# Patient Record
Sex: MALE | Race: OTHER | URBAN - METROPOLITAN AREA
[De-identification: names, ages, dates, MRNs, and addresses within clinical notes are randomized per-mention and may not be internally consistent; named-entity substitution may affect disease eponyms.]

---

## 2024-05-14 VITALS
OXYGEN SATURATION: 93 % | HEART RATE: 109 BPM | RESPIRATION RATE: 26 BRPM | SYSTOLIC BLOOD PRESSURE: 141 MMHG | TEMPERATURE: 98 F | DIASTOLIC BLOOD PRESSURE: 87 MMHG | WEIGHT: 177.91 LBS | HEIGHT: 74 IN

## 2024-05-14 LAB
ANION GAP SERPL CALC-SCNC: 16 MMOL/L — SIGNIFICANT CHANGE UP (ref 5–17)
BASE EXCESS BLDV CALC-SCNC: 1 MMOL/L — SIGNIFICANT CHANGE UP (ref -2–3)
BASOPHILS # BLD AUTO: 0.02 K/UL — SIGNIFICANT CHANGE UP (ref 0–0.2)
BASOPHILS NFR BLD AUTO: 0.3 % — SIGNIFICANT CHANGE UP (ref 0–2)
BUN SERPL-MCNC: 12 MG/DL — SIGNIFICANT CHANGE UP (ref 7–23)
CALCIUM SERPL-MCNC: 9.9 MG/DL — SIGNIFICANT CHANGE UP (ref 8.4–10.5)
CHLORIDE SERPL-SCNC: 101 MMOL/L — SIGNIFICANT CHANGE UP (ref 96–108)
CO2 BLDV-SCNC: 27.3 MMOL/L — HIGH (ref 22–26)
CO2 SERPL-SCNC: 24 MMOL/L — SIGNIFICANT CHANGE UP (ref 22–31)
CREAT SERPL-MCNC: 0.97 MG/DL — SIGNIFICANT CHANGE UP (ref 0.5–1.3)
EGFR: 106 ML/MIN/1.73M2 — SIGNIFICANT CHANGE UP
EOSINOPHIL # BLD AUTO: 0 K/UL — SIGNIFICANT CHANGE UP (ref 0–0.5)
EOSINOPHIL NFR BLD AUTO: 0 % — SIGNIFICANT CHANGE UP (ref 0–6)
GAS PNL BLDV: SIGNIFICANT CHANGE UP
GLUCOSE SERPL-MCNC: 118 MG/DL — HIGH (ref 70–99)
HCO3 BLDV-SCNC: 26 MMOL/L — SIGNIFICANT CHANGE UP (ref 22–29)
HCT VFR BLD CALC: 43.6 % — SIGNIFICANT CHANGE UP (ref 39–50)
HGB BLD-MCNC: 14.6 G/DL — SIGNIFICANT CHANGE UP (ref 13–17)
IMM GRANULOCYTES NFR BLD AUTO: 0.4 % — SIGNIFICANT CHANGE UP (ref 0–0.9)
LYMPHOCYTES # BLD AUTO: 0.59 K/UL — LOW (ref 1–3.3)
LYMPHOCYTES # BLD AUTO: 7.8 % — LOW (ref 13–44)
MCHC RBC-ENTMCNC: 30.7 PG — SIGNIFICANT CHANGE UP (ref 27–34)
MCHC RBC-ENTMCNC: 33.5 GM/DL — SIGNIFICANT CHANGE UP (ref 32–36)
MCV RBC AUTO: 91.8 FL — SIGNIFICANT CHANGE UP (ref 80–100)
MONOCYTES # BLD AUTO: 0.09 K/UL — SIGNIFICANT CHANGE UP (ref 0–0.9)
MONOCYTES NFR BLD AUTO: 1.2 % — LOW (ref 2–14)
NEUTROPHILS # BLD AUTO: 6.81 K/UL — SIGNIFICANT CHANGE UP (ref 1.8–7.4)
NEUTROPHILS NFR BLD AUTO: 90.3 % — HIGH (ref 43–77)
NRBC # BLD: 0 /100 WBCS — SIGNIFICANT CHANGE UP (ref 0–0)
PCO2 BLDV: 42 MMHG — SIGNIFICANT CHANGE UP (ref 42–55)
PH BLDV: 7.4 — SIGNIFICANT CHANGE UP (ref 7.32–7.43)
PLATELET # BLD AUTO: 365 K/UL — SIGNIFICANT CHANGE UP (ref 150–400)
PO2 BLDV: 54 MMHG — HIGH (ref 25–45)
POTASSIUM SERPL-MCNC: 4.1 MMOL/L — SIGNIFICANT CHANGE UP (ref 3.5–5.3)
POTASSIUM SERPL-SCNC: 4.1 MMOL/L — SIGNIFICANT CHANGE UP (ref 3.5–5.3)
RBC # BLD: 4.75 M/UL — SIGNIFICANT CHANGE UP (ref 4.2–5.8)
RBC # FLD: 11.9 % — SIGNIFICANT CHANGE UP (ref 10.3–14.5)
SAO2 % BLDV: 87.2 % — SIGNIFICANT CHANGE UP (ref 67–88)
SODIUM SERPL-SCNC: 141 MMOL/L — SIGNIFICANT CHANGE UP (ref 135–145)
WBC # BLD: 7.54 K/UL — SIGNIFICANT CHANGE UP (ref 3.8–10.5)
WBC # FLD AUTO: 7.54 K/UL — SIGNIFICANT CHANGE UP (ref 3.8–10.5)

## 2024-05-14 PROCEDURE — 71046 X-RAY EXAM CHEST 2 VIEWS: CPT | Mod: 26

## 2024-05-14 PROCEDURE — 99285 EMERGENCY DEPT VISIT HI MDM: CPT

## 2024-05-14 RX ORDER — IPRATROPIUM/ALBUTEROL SULFATE 18-103MCG
3 AEROSOL WITH ADAPTER (GRAM) INHALATION
Refills: 0 | Status: COMPLETED | OUTPATIENT
Start: 2024-05-14 | End: 2024-05-15

## 2024-05-14 RX ORDER — IPRATROPIUM/ALBUTEROL SULFATE 18-103MCG
3 AEROSOL WITH ADAPTER (GRAM) INHALATION
Refills: 0 | Status: COMPLETED | OUTPATIENT
Start: 2024-05-14 | End: 2024-05-14

## 2024-05-14 RX ORDER — MAGNESIUM SULFATE 500 MG/ML
2 VIAL (ML) INJECTION ONCE
Refills: 0 | Status: COMPLETED | OUTPATIENT
Start: 2024-05-14 | End: 2024-05-14

## 2024-05-14 RX ADMIN — Medication 3 MILLILITER(S): at 22:12

## 2024-05-14 RX ADMIN — Medication 3 MILLILITER(S): at 22:06

## 2024-05-14 RX ADMIN — Medication 3 MILLILITER(S): at 22:17

## 2024-05-14 RX ADMIN — Medication 125 MILLIGRAM(S): at 22:14

## 2024-05-14 RX ADMIN — Medication 150 GRAM(S): at 22:20

## 2024-05-14 NOTE — ED ADULT TRIAGE NOTE - CHIEF COMPLAINT QUOTE
"My asthma is acting up. I was just at [facility] and they wanted to admit me but they was taking too long."

## 2024-05-14 NOTE — ED PROVIDER NOTE - OBJECTIVE STATEMENT
34 yr old male, history of asthma (never hospitalized or intubated), presents to the Emergency Department w asthma exacerbation. pt w one week of worsening asthma symptoms. 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 has needed to go to ER ([place]) every few weeks for nebulizer treatments and steroids.   today at [place] got multiple rounds of duonebs, steroids, mag. had minimal improvement and was planned for admission but was told he would not get a bed so he left and came here for management.  previous smoker - quit a few months ago when symptoms worsened. 34 yr old male, history of asthma (never hospitalized or intubated), presents to the Emergency Department w asthma exacerbation. pt w one week of worsening asthma symptoms. 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 has needed to go to ER ([place]) every few weeks for nebulizer treatments and steroids. symptoms this week worse, no relief w home meds. today at [place] got multiple rounds of duonebs, steroids, mag. had minimal improvement and was planned for admission but was told he would not get a bed so he left and came here for management.  previous smoker - quit a few months ago when symptoms worsened.

## 2024-05-14 NOTE — ED PROVIDER NOTE - PHYSICAL EXAMINATION
Constitutional : non-toxic, slightly distressed / anxious. awake, alert, oriented to person, place, time/situation.  Head : head normocephalic, atraumatic  EENMT : eyes clear bilaterally, PERRL, EOMI. airway patent. moist mucous membranes. neck supple. oropharynx noninjected, no tonsillar hypertrophy or exudates. uvula midline w/o edema. tolerating secretions.   Cardiac : Normal rate, regular rhythm. No murmur appreciated, no LE edema.  Resp : +tachypenic. poor air movement. diffuse inspiratory and expiratory wheezing. no rales / rhonchi. no stridor.   Gastro : abdomen soft, nontender, nondistended. no rebound or guarding. no CVAT.  MSK :  range of motion is not limited, no muscle or joint tenderness  Back : No evidence of trauma. No spinal or CVA tenderness.  Vasc : Extremities warm and well perfused. 2+ radial and DP pulses. cap refill <2 seconds  Neuro : Alert and oriented, CNII-XII grossly intact, no motor or sensory deficits.  Skin : Skin normal color for race, warm, dry and intact. No evidence of rash.  Psych : Alert and oriented to person, place, time/situation. normal mood and affect. no apparent risk to self or others.

## 2024-05-14 NOTE — ED ADULT NURSE NOTE - OBJECTIVE STATEMENT
pt c/o asthma exac. was going to be admitted at [facility] but left d/t long wait. received nebs, magnesium, and steroid at [facility] "hours ago." pt states was feeling better at [place] but sx worsened during his commute here. pt is struggling to make sentences w/ increased work of breathing. satting 97% on ra. started on duoneb. on ccm

## 2024-05-14 NOTE — ED PROVIDER NOTE - CLINICAL SUMMARY MEDICAL DECISION MAKING FREE TEXT BOX
history of asthma (never hospitalized or intubated), poorly controlled asthma symptoms x 1 year since starting new job @ [employer]. here w one week worsening symptoms and no relief w home inhalers. went to [place] - planned for admission after duonebs / steroids / meds but wasn't going to get a bed so came for eval.   pt nontoxic appearing but notably tachypenic, tachycardic, sats 93% on room air, lungs w poor air movement. diffuse inspiratory and expiratory wheezing. no rales / rhonchi. no stridor.   asthma exacerbation w poor baseline control x 1 yr.   r/o viral illness, pna.   trial duonebs, steroids and mag.   will need admission

## 2024-05-14 NOTE — ED ADULT NURSE NOTE - CHIEF COMPLAINT QUOTE
"My asthma is acting up. I was just at [facility] and they wanted to admit me but they was taking too long."

## 2024-05-14 NOTE — ED PROVIDER NOTE - PROGRESS NOTE DETAILS
[Dear  ___] : Dear  [unfilled], [Consult Letter:] : I had the pleasure of evaluating your patient, [unfilled]. [Please see my note below.] : Please see my note below. [Consult Closing:] : Thank you very much for allowing me to participate in the care of this patient.  If you have any questions, please do not hesitate to contact me. [Sincerely,] : Sincerely, [FreeTextEntry3] : Beto Bui MD\par Director, IBD Program\par [facility], [facility]\par \par Associate Professor of Medicine\par [facility]\par [employer]\par  labs unremarkable - vbg ok not acidotic.   cxr clear. rvp negative  after duonebs, steroids and mag pt subjectively feels slightly better.   still tachypneic, hypoxic to 91-92% on room air, sats ok on 2L NC, improved air movement but still w diffuse wheezing.   considered bipap but pt does subjectively feel improving.   icu consulted for tele bed  pt accepted to tele.

## 2024-05-15 ENCOUNTER — INPATIENT (INPATIENT)
Facility: HOSPITAL | Age: 34
LOS: 0 days | Discharge: LEFT BEFORE DISCHARGE | End: 2024-05-15
Attending: STUDENT IN AN ORGANIZED HEALTH CARE EDUCATION/TRAINING PROGRAM | Admitting: INTERNAL MEDICINE
Payer: COMMERCIAL

## 2024-05-15 VITALS
HEART RATE: 86 BPM | SYSTOLIC BLOOD PRESSURE: 128 MMHG | TEMPERATURE: 98 F | OXYGEN SATURATION: 93 % | DIASTOLIC BLOOD PRESSURE: 67 MMHG | RESPIRATION RATE: 18 BRPM

## 2024-05-15 DIAGNOSIS — Z29.9 ENCOUNTER FOR PROPHYLACTIC MEASURES, UNSPECIFIED: ICD-10-CM

## 2024-05-15 DIAGNOSIS — J45.901 UNSPECIFIED ASTHMA WITH (ACUTE) EXACERBATION: ICD-10-CM

## 2024-05-15 LAB
ALBUMIN SERPL ELPH-MCNC: 4.5 G/DL — SIGNIFICANT CHANGE UP (ref 3.3–5)
ALP SERPL-CCNC: 62 U/L — SIGNIFICANT CHANGE UP (ref 40–120)
ALT FLD-CCNC: 19 U/L — SIGNIFICANT CHANGE UP (ref 10–45)
ANION GAP SERPL CALC-SCNC: 15 MMOL/L — SIGNIFICANT CHANGE UP (ref 5–17)
AST SERPL-CCNC: 24 U/L — SIGNIFICANT CHANGE UP (ref 10–40)
BASE EXCESS BLDA CALC-SCNC: 0 MMOL/L — SIGNIFICANT CHANGE UP (ref -2–3)
BASOPHILS # BLD AUTO: 0.01 K/UL — SIGNIFICANT CHANGE UP (ref 0–0.2)
BASOPHILS NFR BLD AUTO: 0.1 % — SIGNIFICANT CHANGE UP (ref 0–2)
BILIRUB SERPL-MCNC: 0.9 MG/DL — SIGNIFICANT CHANGE UP (ref 0.2–1.2)
BUN SERPL-MCNC: 11 MG/DL — SIGNIFICANT CHANGE UP (ref 7–23)
CALCIUM SERPL-MCNC: 10 MG/DL — SIGNIFICANT CHANGE UP (ref 8.4–10.5)
CHLORIDE SERPL-SCNC: 101 MMOL/L — SIGNIFICANT CHANGE UP (ref 96–108)
CO2 BLDA-SCNC: 26 MMOL/L — HIGH (ref 19–24)
CO2 SERPL-SCNC: 23 MMOL/L — SIGNIFICANT CHANGE UP (ref 22–31)
CREAT SERPL-MCNC: 0.89 MG/DL — SIGNIFICANT CHANGE UP (ref 0.5–1.3)
EGFR: 117 ML/MIN/1.73M2 — SIGNIFICANT CHANGE UP
EOSINOPHIL # BLD AUTO: 0 K/UL — SIGNIFICANT CHANGE UP (ref 0–0.5)
EOSINOPHIL NFR BLD AUTO: 0 % — SIGNIFICANT CHANGE UP (ref 0–6)
GAS PNL BLDA: SIGNIFICANT CHANGE UP
GLUCOSE SERPL-MCNC: 148 MG/DL — HIGH (ref 70–99)
HCO3 BLDA-SCNC: 25 MMOL/L — SIGNIFICANT CHANGE UP (ref 21–28)
HCT VFR BLD CALC: 45 % — SIGNIFICANT CHANGE UP (ref 39–50)
HGB BLD-MCNC: 14.7 G/DL — SIGNIFICANT CHANGE UP (ref 13–17)
IMM GRANULOCYTES NFR BLD AUTO: 0.4 % — SIGNIFICANT CHANGE UP (ref 0–0.9)
LYMPHOCYTES # BLD AUTO: 0.58 K/UL — LOW (ref 1–3.3)
LYMPHOCYTES # BLD AUTO: 5.5 % — LOW (ref 13–44)
MAGNESIUM SERPL-MCNC: 2.9 MG/DL — HIGH (ref 1.6–2.6)
MCHC RBC-ENTMCNC: 30.8 PG — SIGNIFICANT CHANGE UP (ref 27–34)
MCHC RBC-ENTMCNC: 32.7 GM/DL — SIGNIFICANT CHANGE UP (ref 32–36)
MCV RBC AUTO: 94.1 FL — SIGNIFICANT CHANGE UP (ref 80–100)
MONOCYTES # BLD AUTO: 0.03 K/UL — SIGNIFICANT CHANGE UP (ref 0–0.9)
MONOCYTES NFR BLD AUTO: 0.3 % — LOW (ref 2–14)
NEUTROPHILS # BLD AUTO: 9.92 K/UL — HIGH (ref 1.8–7.4)
NEUTROPHILS NFR BLD AUTO: 93.7 % — HIGH (ref 43–77)
NRBC # BLD: 0 /100 WBCS — SIGNIFICANT CHANGE UP (ref 0–0)
PCO2 BLDA: 40 MMHG — SIGNIFICANT CHANGE UP (ref 35–48)
PH BLDA: 7.4 — SIGNIFICANT CHANGE UP (ref 7.35–7.45)
PHOSPHATE SERPL-MCNC: 3.9 MG/DL — SIGNIFICANT CHANGE UP (ref 2.5–4.5)
PLATELET # BLD AUTO: 353 K/UL — SIGNIFICANT CHANGE UP (ref 150–400)
PO2 BLDA: 82 MMHG — LOW (ref 83–108)
POTASSIUM SERPL-MCNC: 4.7 MMOL/L — SIGNIFICANT CHANGE UP (ref 3.5–5.3)
POTASSIUM SERPL-SCNC: 4.7 MMOL/L — SIGNIFICANT CHANGE UP (ref 3.5–5.3)
PROT SERPL-MCNC: 7.2 G/DL — SIGNIFICANT CHANGE UP (ref 6–8.3)
RAPID RVP RESULT: SIGNIFICANT CHANGE UP
RBC # BLD: 4.78 M/UL — SIGNIFICANT CHANGE UP (ref 4.2–5.8)
RBC # FLD: 11.8 % — SIGNIFICANT CHANGE UP (ref 10.3–14.5)
SAO2 % BLDA: 94 % — SIGNIFICANT CHANGE UP (ref 94–98)
SARS-COV-2 RNA SPEC QL NAA+PROBE: SIGNIFICANT CHANGE UP
SODIUM SERPL-SCNC: 139 MMOL/L — SIGNIFICANT CHANGE UP (ref 135–145)
WBC # BLD: 10.58 K/UL — HIGH (ref 3.8–10.5)
WBC # FLD AUTO: 10.58 K/UL — HIGH (ref 3.8–10.5)

## 2024-05-15 PROCEDURE — 36415 COLL VENOUS BLD VENIPUNCTURE: CPT

## 2024-05-15 PROCEDURE — 96374 THER/PROPH/DIAG INJ IV PUSH: CPT

## 2024-05-15 PROCEDURE — 99222 1ST HOSP IP/OBS MODERATE 55: CPT | Mod: GC

## 2024-05-15 PROCEDURE — 83735 ASSAY OF MAGNESIUM: CPT

## 2024-05-15 PROCEDURE — 0225U NFCT DS DNA&RNA 21 SARSCOV2: CPT

## 2024-05-15 PROCEDURE — 85025 COMPLETE CBC W/AUTO DIFF WBC: CPT

## 2024-05-15 PROCEDURE — 99233 SBSQ HOSP IP/OBS HIGH 50: CPT | Mod: GC

## 2024-05-15 PROCEDURE — 82803 BLOOD GASES ANY COMBINATION: CPT

## 2024-05-15 PROCEDURE — 96375 TX/PRO/DX INJ NEW DRUG ADDON: CPT

## 2024-05-15 PROCEDURE — 93005 ELECTROCARDIOGRAM TRACING: CPT

## 2024-05-15 PROCEDURE — 94660 CPAP INITIATION&MGMT: CPT

## 2024-05-15 PROCEDURE — 80053 COMPREHEN METABOLIC PANEL: CPT

## 2024-05-15 PROCEDURE — 80048 BASIC METABOLIC PNL TOTAL CA: CPT

## 2024-05-15 PROCEDURE — 71046 X-RAY EXAM CHEST 2 VIEWS: CPT

## 2024-05-15 PROCEDURE — 93010 ELECTROCARDIOGRAM REPORT: CPT

## 2024-05-15 PROCEDURE — 99285 EMERGENCY DEPT VISIT HI MDM: CPT | Mod: 25

## 2024-05-15 PROCEDURE — 84100 ASSAY OF PHOSPHORUS: CPT

## 2024-05-15 PROCEDURE — 94640 AIRWAY INHALATION TREATMENT: CPT

## 2024-05-15 RX ORDER — ALBUTEROL 90 UG/1
2 AEROSOL, METERED ORAL
Qty: 1 | Refills: 0
Start: 2024-05-15 | End: 2024-06-13

## 2024-05-15 RX ORDER — BUDESONIDE AND FORMOTEROL FUMARATE DIHYDRATE 160; 4.5 UG/1; UG/1
2 AEROSOL RESPIRATORY (INHALATION)
Refills: 0 | Status: DISCONTINUED | OUTPATIENT
Start: 2024-05-15 | End: 2024-05-15

## 2024-05-15 RX ORDER — MAGNESIUM SULFATE 500 MG/ML
2 VIAL (ML) INJECTION ONCE
Refills: 0 | Status: COMPLETED | OUTPATIENT
Start: 2024-05-15 | End: 2024-05-15

## 2024-05-15 RX ORDER — BUDESONIDE, MICRONIZED 100 %
0.5 POWDER (GRAM) MISCELLANEOUS EVERY 12 HOURS
Refills: 0 | Status: DISCONTINUED | OUTPATIENT
Start: 2024-05-15 | End: 2024-05-15

## 2024-05-15 RX ORDER — BUDESONIDE AND FORMOTEROL FUMARATE DIHYDRATE 160; 4.5 UG/1; UG/1
2 AEROSOL RESPIRATORY (INHALATION)
Qty: 1 | Refills: 0
Start: 2024-05-15 | End: 2024-06-13

## 2024-05-15 RX ORDER — CHLORHEXIDINE GLUCONATE 213 G/1000ML
1 SOLUTION TOPICAL
Refills: 0 | Status: DISCONTINUED | OUTPATIENT
Start: 2024-05-15 | End: 2024-05-15

## 2024-05-15 RX ORDER — PANTOPRAZOLE SODIUM 20 MG/1
40 TABLET, DELAYED RELEASE ORAL
Refills: 0 | Status: DISCONTINUED | OUTPATIENT
Start: 2024-05-16 | End: 2024-05-15

## 2024-05-15 RX ORDER — IPRATROPIUM/ALBUTEROL SULFATE 18-103MCG
3 AEROSOL WITH ADAPTER (GRAM) INHALATION EVERY 4 HOURS
Refills: 0 | Status: DISCONTINUED | OUTPATIENT
Start: 2024-05-15 | End: 2024-05-15

## 2024-05-15 RX ORDER — ALBUTEROL 90 UG/1
2 AEROSOL, METERED ORAL
Refills: 0 | DISCHARGE

## 2024-05-15 RX ADMIN — Medication 3 MILLILITER(S): at 06:51

## 2024-05-15 RX ADMIN — Medication 0.5 MILLIGRAM(S): at 10:10

## 2024-05-15 RX ADMIN — Medication 3 MILLILITER(S): at 00:46

## 2024-05-15 RX ADMIN — Medication 0.5 MILLIGRAM(S): at 02:22

## 2024-05-15 RX ADMIN — CHLORHEXIDINE GLUCONATE 1 APPLICATION(S): 213 SOLUTION TOPICAL at 07:18

## 2024-05-15 RX ADMIN — Medication 3 MILLILITER(S): at 10:11

## 2024-05-15 RX ADMIN — BUDESONIDE AND FORMOTEROL FUMARATE DIHYDRATE 2 PUFF(S): 160; 4.5 AEROSOL RESPIRATORY (INHALATION) at 18:57

## 2024-05-15 RX ADMIN — Medication 3 MILLILITER(S): at 21:48

## 2024-05-15 RX ADMIN — Medication 3 MILLILITER(S): at 01:08

## 2024-05-15 RX ADMIN — Medication 3 MILLILITER(S): at 17:47

## 2024-05-15 RX ADMIN — Medication 40 MILLIGRAM(S): at 11:06

## 2024-05-15 RX ADMIN — Medication 150 GRAM(S): at 01:55

## 2024-05-15 RX ADMIN — Medication 3 MILLILITER(S): at 00:31

## 2024-05-15 RX ADMIN — Medication 3 MILLILITER(S): at 14:24

## 2024-05-15 NOTE — H&P ADULT - ASSESSMENT
Patient is 34yMale with PMHx of *** who presents with ***.     NEURO  #BALTAZAR    CV  #BALTAZAR      PULM  #Asthma Exacerbation   Patient with known h/o asthma, has not been able to follow-up with PCP in 1 year. Utilizing ED for exacerbation management. Off any home meds or inhalers. CXR with no infiltrates.   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 Found with tachypnea and poor air movement. Admitted to MICU given high risk for intubation given worsening respiratory     NEURO  #BALTAZAR    CV  #Tachycardia   i/s/o respiratory pathology   As below      PULM  #Asthma Exacerbation   Likely i/s/o exposure to dander   Patient with known h/o asthma, has not been able to follow-up with PCP in 1 year. Utilizing ED for exacerbation management. Off any home meds or inhalers. CXR with no infiltrates.   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

## 2024-05-15 NOTE — PROGRESS NOTE ADULT - SUBJECTIVE AND OBJECTIVE BOX
*****************TRANSFER FROM MICU TO [facility]***************  HOSPITAL COURSE:  34M with PMHx of asthma with multiple recent exacerbations (no prior hospitalizations or intubations) who presented with sob, found to be in acute asthma exacerbation. In the ED received mg x2, methylprednisolone 125mg ivp and duonebs. No fevers or leukocytosis and CXR without opacities. Placed on bipap and admitted to MICU initially. Started on prednisone 40 and budesonide. Tachycardia resolved, respiratory status improved with weaning off BIPAP to RA. Patient clinically stable for transfer to [facility].    INTERVAL EVENTS: CARMEN    SUBJECTIVE / INTERVAL HPI: Patient seen and examined at bedside. Reports feeling well and being more comfortable since this morning. He wanted to go home but admits to still having some chest tightness and wheezing. Denies fevers, chills, SOB, chest pain, palpitations, cough, difficulty breathing. Symptoms began shortly after finishing recent prednisone course for 4 days 5/10/24-24. 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 He is frequently around dogs, cats, and birds. Had asthma and allergies as a kid. Admits to smoking tobacco +marijuana joints, about 5 per day since age 20, total of about >15 yeas. He stopped smoking a few weeks ago when his exacerbations became even more frequent.     Notes his grandfather  from an asthma exacerbation. He would like to go home to his son as soon as possible but will stay until he is medically cleared. He understands that is he wishes to leave, he will have to leave against medical advice.     ROS: negative unless otherwise stated above.    VITAL SIGNS:  Vital Signs Last 24 Hrs  T(C): 36.6 (15 May 2024 17:00), Max: 36.8 (15 May 2024 01:45)  T(F): 97.9 (15 May 2024 17:00), Max: 98.3 (15 May 2024 14:00)  HR: 92 (15 May 2024 17:00) (74 - 113)  BP: 127/57 (15 May 2024 17:00) (114/64 - 183/83)  BP(mean): 82 (15 May 2024 17:00) (79 - 118)  RR: 20 (15 May 2024 17:00) (14 - 26)  SpO2: 92% (15 May 2024 17:00) (91% - 98%)    Parameters below as of 15 May 2024 17:00  Patient On (Oxygen Delivery Method): room air          24 @ 07:01  -  05-15-24 @ 07:00  --------------------------------------------------------  IN: 50 mL / OUT: 300 mL / NET: -250 mL    05-15-24 @ 07:01  -  05-15-24 @ 17:35  --------------------------------------------------------  IN: 240 mL / OUT: 600 mL / NET: -360 mL        PHYSICAL EXAM:  Constitutional: young male resting comfortably; NAD  HEENT: NC/AT, PERRL, EOMI, anicteric sclera, MMM  Neck: supple; no JVD or thyromegaly  Respiratory: +Tachypneic, +Mild expiratory wheezing b/l, +Poor air movement, no retractions or accessory muscle use, speaking in full sentences  Cardiac: +S1/S2; RRR; no M/R/G  Gastrointestinal: soft, NT/ND; no rebound or guarding; +BSx4  Extremities: WWP, no clubbing or cyanosis; no peripheral edema  Musculoskeletal: NROM x4; no joint swelling, tenderness or erythema  Vascular: 2+ radial, DP/PT pulses B/L  Dermatologic: skin warm, dry and intact; no rashes, wounds, or scars  Neurologic: AAOx3, no focal deficits  Psychiatric: calm, cooperative, behaviors are appropriate    MEDICATIONS:  MEDICATIONS  (STANDING):  albuterol/ipratropium for Nebulization 3 milliLiter(s) Nebulizer every 4 hours  budesonide 160 MICROgram(s)/formoterol 4.5 MICROgram(s) Inhaler 2 Puff(s) Inhalation two times a day  predniSONE   Tablet 40 milliGRAM(s) Oral every 24 hours    MEDICATIONS  (PRN):      ALLERGIES:  Allergies    Pineapple (Other (Mild))  No Known Drug Allergies    Intolerances        LABS:                        14.7   10.58 )-----------( 353      ( 15 May 2024 05:28 )             45.0     05-15    139  |  101  |  11  ----------------------------<  148<H>  4.7   |  23  |  0.89    Ca    10.0      15 May 2024 05:28  Phos  3.9     05-15  Mg     2.9     05-15    TPro  7.2  /  Alb  4.5  /  TBili  0.9  /  DBili  x   /  AST  24  /  ALT  19  /  AlkPhos  62  05-15      Urinalysis Basic - ( 15 May 2024 05:28 )    Color: x / Appearance: x / SG: x / pH: x  Gluc: 148 mg/dL / Ketone: x  / Bili: x / Urobili: x   Blood: x / Protein: x / Nitrite: x   Leuk Esterase: x / RBC: x / WBC x   Sq Epi: x / Non Sq Epi: x / Bacteria: x      CAPILLARY BLOOD GLUCOSE    RADIOLOGY & ADDITIONAL TESTS: Reviewed as above

## 2024-05-15 NOTE — PROGRESS NOTE ADULT - ATTENDING COMMENTS
34 year old male with a PMHx of asthma whop presented with acute asthma exacerbation, initially admitted to MICU for NIPPV, now on RA and stable for RMF.     Plan:   -obtain daily peak flows  -continue with prednisone, symbicort and scheduled duonebs   -ambulatory pulse ox   -needs to establish care with pulmonology upon discharge     Rest of plan as per resident note.
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

## 2024-05-15 NOTE — H&P ADULT - HISTORY OF PRESENT ILLNESS
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 Found with tachypnea and poor air movement. Admitted to MICU given high risk for intubation given worsening respiratory     In the ED:  Initial vital signs: T: 97.9 F, HR: 109, BP: 141/87, R: 25-35, SpO2: 93% on RA  Labs: unremarkable  Imaging: CXR: negative for infiltrate or opacity  Intervention: Magnesium sulfate 2g x2, methylprednisolone 125mg IVP, Albuterol/ipratropium x3.   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 Denies any fever, cough prior to arrival.     In the ED:  Initial vital signs: T: 97.9 F, HR: 109, BP: 141/87, R: 25-35, SpO2: 93% on RA  Labs: unremarkable  Imaging: CXR: negative for infiltrate or opacity  Intervention: Magnesium sulfate 2g x2, methylprednisolone 125mg IVP, Albuterol/ipratropium x3.   Dispo: MICU

## 2024-05-15 NOTE — PROGRESS NOTE ADULT - SUBJECTIVE AND OBJECTIVE BOX
** INCOMPLETE **     INTERVAL HPI/OVERNIGHT EVENTS:    SUBJECTIVE: Patient seen and examined at bedside.    VITAL SIGNS:  ICU Vital Signs Last 24 Hrs  T(C): 36.3 (15 May 2024 06:14), Max: 36.8 (15 May 2024 01:45)  T(F): 97.3 (15 May 2024 06:14), Max: 98.2 (15 May 2024 01:45)  HR: 81 (15 May 2024 06:00) (81 - 113)  BP: 151/65 (15 May 2024 06:00) (126/65 - 183/83)  BP(mean): 93 (15 May 2024 06:00) (86 - 118)  ABP: --  ABP(mean): --  RR: 19 (15 May 2024 06:00) (14 - 26)  SpO2: 96% (15 May 2024 06:00) (92% - 98%)    O2 Parameters below as of 15 May 2024 06:00  Patient On (Oxygen Delivery Method): BiPAP/CPAP    O2 Concentration (%): 30          05-14 @ 07:01  -  05-15 @ 06:59  --------------------------------------------------------  IN: 50 mL / OUT: 300 mL / NET: -250 mL      CAPILLARY BLOOD GLUCOSE          PHYSICAL EXAM:  Constitutional: NAD  HEENT: NC/AT; PERRL, anicteric sclera; MMM  Neck: supple, no JVD  Cardiovascular: +S1/S2, RRR  Respiratory: CTA B/L, no W/R/R  Gastrointestinal: abdomen soft, NT/ND; no rebound or guarding; +BSx4  Genitourinary: no suprapubic tenderness or fullness  Extremities: WWP; no LE edema; no clubbing or cyanosis  Vascular: 2+ radial, DP/PT and femoral pulses B/L  Dermatologic: normal color and turgor; no visible rashes  Neurological: AO     MEDICATIONS:  MEDICATIONS  (STANDING):  albuterol/ipratropium for Nebulization 3 milliLiter(s) Nebulizer every 4 hours  buDESOnide    Inhalation Suspension 0.5 milliGRAM(s) Inhalation every 12 hours  chlorhexidine 2% Cloths 1 Application(s) Topical <User Schedule>  predniSONE   Tablet 40 milliGRAM(s) Oral every 24 hours    MEDICATIONS  (PRN):      LABS:                        14.7   10.58 )-----------( 353      ( 15 May 2024 05:28 )             45.0     05-15    139  |  101  |  11  ----------------------------<  148<H>  4.7   |  23  |  0.89    Ca    10.0      15 May 2024 05:28  Phos  3.9     05-15  Mg     2.9     05-15    TPro  7.2  /  Alb  4.5  /  TBili  0.9  /  DBili  x   /  AST  24  /  ALT  19  /  AlkPhos  62  05-15      Urinalysis Basic - ( 15 May 2024 05:28 )    Color: x / Appearance: x / SG: x / pH: x  Gluc: 148 mg/dL / Ketone: x  / Bili: x / Urobili: x   Blood: x / Protein: x / Nitrite: x   Leuk Esterase: x / RBC: x / WBC x   Sq Epi: x / Non Sq Epi: x / Bacteria: x        RADIOLOGY & ADDITIONAL TESTS: Reviewed. ** INCOMPLETE **   Hospital course;  34 m pmhx of asthma presented with sob found to be in acute asthma exacerbation placed on bipap and admitted to micu. In the ED received mg 2g x2, methylprednisolone 125mg ivp and duonebs. CXR without opacity. Weaned off bipap. Tachycardia resolved, respiratory status improved, patient clinically improved and ready for transfer to [facility].    INTERVAL HPI/OVERNIGHT EVENTS:    SUBJECTIVE: Patient seen and examined at bedside.    VITAL SIGNS:  ICU Vital Signs Last 24 Hrs  T(C): 36.3 (15 May 2024 06:14), Max: 36.8 (15 May 2024 01:45)  T(F): 97.3 (15 May 2024 06:14), Max: 98.2 (15 May 2024 01:45)  HR: 81 (15 May 2024 06:00) (81 - 113)  BP: 151/65 (15 May 2024 06:00) (126/65 - 183/83)  BP(mean): 93 (15 May 2024 06:00) (86 - 118)  ABP: --  ABP(mean): --  RR: 19 (15 May 2024 06:00) (14 - 26)  SpO2: 96% (15 May 2024 06:00) (92% - 98%)    O2 Parameters below as of 15 May 2024 06:00  Patient On (Oxygen Delivery Method): BiPAP/CPAP    O2 Concentration (%): 30          05-14 @ 07:01  -  05-15 @ 06:59  --------------------------------------------------------  IN: 50 mL / OUT: 300 mL / NET: -250 mL      CAPILLARY BLOOD GLUCOSE          PHYSICAL EXAM:  Constitutional: NAD  HEENT: NC/AT; PERRL, anicteric sclera; MMM  Neck: supple, no JVD  Cardiovascular: +S1/S2, RRR  Respiratory: CTA B/L, no W/R/R  Gastrointestinal: abdomen soft, NT/ND; no rebound or guarding; +BSx4  Genitourinary: no suprapubic tenderness or fullness  Extremities: WWP; no LE edema; no clubbing or cyanosis  Vascular: 2+ radial, DP/PT and femoral pulses B/L  Dermatologic: normal color and turgor; no visible rashes  Neurological: AO     MEDICATIONS:  MEDICATIONS  (STANDING):  albuterol/ipratropium for Nebulization 3 milliLiter(s) Nebulizer every 4 hours  buDESOnide    Inhalation Suspension 0.5 milliGRAM(s) Inhalation every 12 hours  chlorhexidine 2% Cloths 1 Application(s) Topical <User Schedule>  predniSONE   Tablet 40 milliGRAM(s) Oral every 24 hours    MEDICATIONS  (PRN):      LABS:                        14.7   10.58 )-----------( 353      ( 15 May 2024 05:28 )             45.0     05-15    139  |  101  |  11  ----------------------------<  148<H>  4.7   |  23  |  0.89    Ca    10.0      15 May 2024 05:28  Phos  3.9     05-15  Mg     2.9     05-15    TPro  7.2  /  Alb  4.5  /  TBili  0.9  /  DBili  x   /  AST  24  /  ALT  19  /  AlkPhos  62  05-15      Urinalysis Basic - ( 15 May 2024 05:28 )    Color: x / Appearance: x / SG: x / pH: x  Gluc: 148 mg/dL / Ketone: x  / Bili: x / Urobili: x   Blood: x / Protein: x / Nitrite: x   Leuk Esterase: x / RBC: x / WBC x   Sq Epi: x / Non Sq Epi: x / Bacteria: x        RADIOLOGY & ADDITIONAL TESTS: Reviewed. ** INCOMPLETE **   Hospital course;  34 m pmhx of asthma presented with sob found to be in acute asthma exacerbation placed on bipap and admitted to micu. In the ED received mg 2g x2, methylprednisolone 125mg ivp and duonebs. CXR without opacity. Weaned off bipap. Tachycardia resolved, respiratory status improved, patient clinically improved and ready for transfer to [facility].    INTERVAL HPI/OVERNIGHT EVENTS: [place]    SUBJECTIVE: Patient seen and examined at bedside. Reports throat tightness and sob while ambulating. Denies fevers/chills, chest pain, abdominal pain diarrhea/constipation or gu sxs.    VITAL SIGNS:  ICU Vital Signs Last 24 Hrs  T(C): 36.3 (15 May 2024 06:14), Max: 36.8 (15 May 2024 01:45)  T(F): 97.3 (15 May 2024 06:14), Max: 98.2 (15 May 2024 01:45)  HR: 81 (15 May 2024 06:00) (81 - 113)  BP: 151/65 (15 May 2024 06:00) (126/65 - 183/83)  BP(mean): 93 (15 May 2024 06:00) (86 - 118)  ABP: --  ABP(mean): --  RR: 19 (15 May 2024 06:00) (14 - 26)  SpO2: 96% (15 May 2024 06:00) (92% - 98%)    O2 Parameters below as of 15 May 2024 06:00  Patient On (Oxygen Delivery Method): BiPAP/CPAP    O2 Concentration (%): 30          05-14 @ 07:01  -  05-15 @ 06:59  --------------------------------------------------------  IN: 50 mL / OUT: 300 mL / NET: -250 mL      CAPILLARY BLOOD GLUCOSE          PHYSICAL EXAM:  Constitutional: NAD  HEENT: NC/AT; PERRL, anicteric sclera; MMM  Neck: supple, no JVD  Cardiovascular: +S1/S2, RRR  Respiratory: CTA B/L, no W/R/R  Gastrointestinal: abdomen soft, NT/ND; no rebound or guarding; +BSx4  Genitourinary: no suprapubic tenderness or fullness  Extremities: WWP; no LE edema; no clubbing or cyanosis  Vascular: 2+ radial, DP/PT and femoral pulses B/L  Dermatologic: normal color and turgor; no visible rashes  Neurological: AO     MEDICATIONS:  MEDICATIONS  (STANDING):  albuterol/ipratropium for Nebulization 3 milliLiter(s) Nebulizer every 4 hours  buDESOnide    Inhalation Suspension 0.5 milliGRAM(s) Inhalation every 12 hours  chlorhexidine 2% Cloths 1 Application(s) Topical <User Schedule>  predniSONE   Tablet 40 milliGRAM(s) Oral every 24 hours    MEDICATIONS  (PRN):      LABS:                        14.7   10.58 )-----------( 353      ( 15 May 2024 05:28 )             45.0     05-15    139  |  101  |  11  ----------------------------<  148<H>  4.7   |  23  |  0.89    Ca    10.0      15 May 2024 05:28  Phos  3.9     05-15  Mg     2.9     05-15    TPro  7.2  /  Alb  4.5  /  TBili  0.9  /  DBili  x   /  AST  24  /  ALT  19  /  AlkPhos  62  05-15      Urinalysis Basic - ( 15 May 2024 05:28 )    Color: x / Appearance: x / SG: x / pH: x  Gluc: 148 mg/dL / Ketone: x  / Bili: x / Urobili: x   Blood: x / Protein: x / Nitrite: x   Leuk Esterase: x / RBC: x / WBC x   Sq Epi: x / Non Sq Epi: x / Bacteria: x        RADIOLOGY & ADDITIONAL TESTS: Reviewed. Hospital course  34 m pmhx of asthma presented with sob found to be in acute asthma exacerbation placed on bipap and admitted to micu. In the ED received mg 2g x2, methylprednisolone 125mg ivp and duonebs. CXR without opacity. Weaned off bipap. Tachycardia resolved, respiratory status improved, patient clinically improved and ready for transfer to [facility].    INTERVAL HPI/OVERNIGHT EVENTS: [place]    SUBJECTIVE: Patient seen and examined at bedside. Reports throat tightness and sob while ambulating. Denies fevers/chills, chest pain, abdominal pain diarrhea/constipation or gu sxs.    VITAL SIGNS:  ICU Vital Signs Last 24 Hrs  T(C): 36.3 (15 May 2024 06:14), Max: 36.8 (15 May 2024 01:45)  T(F): 97.3 (15 May 2024 06:14), Max: 98.2 (15 May 2024 01:45)  HR: 81 (15 May 2024 06:00) (81 - 113)  BP: 151/65 (15 May 2024 06:00) (126/65 - 183/83)  BP(mean): 93 (15 May 2024 06:00) (86 - 118)  ABP: --  ABP(mean): --  RR: 19 (15 May 2024 06:00) (14 - 26)  SpO2: 96% (15 May 2024 06:00) (92% - 98%)    O2 Parameters below as of 15 May 2024 06:00  Patient On (Oxygen Delivery Method): BiPAP/CPAP    O2 Concentration (%): 30    05-14 @ 07:01  -  05-15 @ 06:59  --------------------------------------------------------  IN: 50 mL / OUT: 300 mL / NET: -250 mL    CAPILLARY BLOOD GLUCOSE    PHYSICAL EXAM:  Constitutional: NAD  HEENT: NC/AT  Neck: supple, no JVD  Cardiovascular: RRR  Respiratory: inspiratory and expiratory wheezes  Gastrointestinal: abdomen soft, NT/ND; no rebound or guarding; +BSx4  Genitourinary: no suprapubic tenderness or fullness  Extremities: WWP; no edema  Vascular: 2+ radial, DP/PT and femoral pulses B/L  Dermatologic: no rash  Neurological: AO 3    MEDICATIONS:  MEDICATIONS  (STANDING):  albuterol/ipratropium for Nebulization 3 milliLiter(s) Nebulizer every 4 hours  buDESOnide    Inhalation Suspension 0.5 milliGRAM(s) Inhalation every 12 hours  chlorhexidine 2% Cloths 1 Application(s) Topical <User Schedule>  predniSONE   Tablet 40 milliGRAM(s) Oral every 24 hours    MEDICATIONS  (PRN):      LABS:                        14.7   10.58 )-----------( 353      ( 15 May 2024 05:28 )             45.0     05-15    139  |  101  |  11  ----------------------------<  148<H>  4.7   |  23  |  0.89    Ca    10.0      15 May 2024 05:28  Phos  3.9     05-15  Mg     2.9     05-15    TPro  7.2  /  Alb  4.5  /  TBili  0.9  /  DBili  x   /  AST  24  /  ALT  19  /  AlkPhos  62  05-15    Urinalysis Basic - ( 15 May 2024 05:28 )    Color: x / Appearance: x / SG: x / pH: x  Gluc: 148 mg/dL / Ketone: x  / Bili: x / Urobili: x   Blood: x / Protein: x / Nitrite: x   Leuk Esterase: x / RBC: x / WBC x   Sq Epi: x / Non Sq Epi: x / Bacteria: x    RADIOLOGY & ADDITIONAL TESTS: Reviewed. Hospital course  34 m pmhx of asthma presented with sob found to be in acute asthma exacerbation placed on bipap and admitted to micu. In the ED received mg 2g x2, methylprednisolone 125mg ivp and duonebs. CXR without opacity. Weaned off bipap. Tachycardia resolved, respiratory status improved, patient clinically improved and ready for transfer to [facility].    INTERVAL HPI/OVERNIGHT EVENTS: [place]    SUBJECTIVE: Patient seen and examined at bedside. Reports chest tightness and sob while ambulating. Denies fevers/chills, chest pain, abdominal pain diarrhea/constipation or gu sxs.    VITAL SIGNS:  ICU Vital Signs Last 24 Hrs  T(C): 36.3 (15 May 2024 06:14), Max: 36.8 (15 May 2024 01:45)  T(F): 97.3 (15 May 2024 06:14), Max: 98.2 (15 May 2024 01:45)  HR: 81 (15 May 2024 06:00) (81 - 113)  BP: 151/65 (15 May 2024 06:00) (126/65 - 183/83)  BP(mean): 93 (15 May 2024 06:00) (86 - 118)  ABP: --  ABP(mean): --  RR: 19 (15 May 2024 06:00) (14 - 26)  SpO2: 96% (15 May 2024 06:00) (92% - 98%)    O2 Parameters below as of 15 May 2024 06:00  Patient On (Oxygen Delivery Method): BiPAP/CPAP    O2 Concentration (%): 30    05-14 @ 07:01  -  05-15 @ 06:59  --------------------------------------------------------  IN: 50 mL / OUT: 300 mL / NET: -250 mL    CAPILLARY BLOOD GLUCOSE    PHYSICAL EXAM:  Constitutional: NAD  HEENT: NC/AT  Neck: supple, no JVD  Cardiovascular: RRR  Respiratory: inspiratory and expiratory wheezes  Gastrointestinal: abdomen soft, NT/ND; no rebound or guarding; +BSx4  Genitourinary: no suprapubic tenderness or fullness  Extremities: WWP; no edema  Vascular: 2+ radial, DP/PT and femoral pulses B/L  Dermatologic: no rash  Neurological: AO 3    MEDICATIONS:  MEDICATIONS  (STANDING):  albuterol/ipratropium for Nebulization 3 milliLiter(s) Nebulizer every 4 hours  buDESOnide    Inhalation Suspension 0.5 milliGRAM(s) Inhalation every 12 hours  chlorhexidine 2% Cloths 1 Application(s) Topical <User Schedule>  predniSONE   Tablet 40 milliGRAM(s) Oral every 24 hours    MEDICATIONS  (PRN):      LABS:                        14.7   10.58 )-----------( 353      ( 15 May 2024 05:28 )             45.0     05-15    139  |  101  |  11  ----------------------------<  148<H>  4.7   |  23  |  0.89    Ca    10.0      15 May 2024 05:28  Phos  3.9     05-15  Mg     2.9     05-15    TPro  7.2  /  Alb  4.5  /  TBili  0.9  /  DBili  x   /  AST  24  /  ALT  19  /  AlkPhos  62  05-15    Urinalysis Basic - ( 15 May 2024 05:28 )    Color: x / Appearance: x / SG: x / pH: x  Gluc: 148 mg/dL / Ketone: x  / Bili: x / Urobili: x   Blood: x / Protein: x / Nitrite: x   Leuk Esterase: x / RBC: x / WBC x   Sq Epi: x / Non Sq Epi: x / Bacteria: x    RADIOLOGY & ADDITIONAL TESTS: Reviewed. Hospital course  34 m pmhx of asthma presented with sob found to be in acute asthma exacerbation placed on bipap and admitted to micu. In the ED received mg x2, methylprednisolone 125mg ivp and duonebs. CXR without opacity. Started on prednisone 40 and budesonide. Weaned off bipap. Tachycardia resolved, respiratory status improved, patient clinically improved and ready for transfer to [facility].    INTERVAL HPI/OVERNIGHT EVENTS: [place]    SUBJECTIVE: Patient seen and examined at bedside. Reports chest tightness and sob while ambulating. Denies fevers/chills, chest pain, abdominal pain diarrhea/constipation or gu sxs.    VITAL SIGNS:  ICU Vital Signs Last 24 Hrs  T(C): 36.3 (15 May 2024 06:14), Max: 36.8 (15 May 2024 01:45)  T(F): 97.3 (15 May 2024 06:14), Max: 98.2 (15 May 2024 01:45)  HR: 81 (15 May 2024 06:00) (81 - 113)  BP: 151/65 (15 May 2024 06:00) (126/65 - 183/83)  BP(mean): 93 (15 May 2024 06:00) (86 - 118)  ABP: --  ABP(mean): --  RR: 19 (15 May 2024 06:00) (14 - 26)  SpO2: 96% (15 May 2024 06:00) (92% - 98%)    O2 Parameters below as of 15 May 2024 06:00  Patient On (Oxygen Delivery Method): BiPAP/CPAP    O2 Concentration (%): 30    05-14 @ 07:01  -  05-15 @ 06:59  --------------------------------------------------------  IN: 50 mL / OUT: 300 mL / NET: -250 mL    CAPILLARY BLOOD GLUCOSE    PHYSICAL EXAM:  Constitutional: NAD  HEENT: NC/AT  Neck: supple, no JVD  Cardiovascular: RRR  Respiratory: bilateral inspiratory and expiratory wheezes  Gastrointestinal: abdomen soft, NT/ND; no rebound or guarding; +BSx4  Genitourinary: no suprapubic tenderness or fullness  Extremities: WWP; no edema  Vascular: 2+ radial, DP/PT and femoral pulses B/L  Dermatologic: no rash  Neurological: AO 3    MEDICATIONS:  MEDICATIONS  (STANDING):  albuterol/ipratropium for Nebulization 3 milliLiter(s) Nebulizer every 4 hours  buDESOnide    Inhalation Suspension 0.5 milliGRAM(s) Inhalation every 12 hours  chlorhexidine 2% Cloths 1 Application(s) Topical <User Schedule>  predniSONE   Tablet 40 milliGRAM(s) Oral every 24 hours    MEDICATIONS  (PRN):      LABS:                        14.7   10.58 )-----------( 353      ( 15 May 2024 05:28 )             45.0     05-15    139  |  101  |  11  ----------------------------<  148<H>  4.7   |  23  |  0.89    Ca    10.0      15 May 2024 05:28  Phos  3.9     05-15  Mg     2.9     05-15    TPro  7.2  /  Alb  4.5  /  TBili  0.9  /  DBili  x   /  AST  24  /  ALT  19  /  AlkPhos  62  05-15    Urinalysis Basic - ( 15 May 2024 05:28 )    Color: x / Appearance: x / SG: x / pH: x  Gluc: 148 mg/dL / Ketone: x  / Bili: x / Urobili: x   Blood: x / Protein: x / Nitrite: x   Leuk Esterase: x / RBC: x / WBC x   Sq Epi: x / Non Sq Epi: x / Bacteria: x    RADIOLOGY & ADDITIONAL TESTS: Reviewed.

## 2024-05-15 NOTE — PROGRESS NOTE ADULT - PROBLEM SELECTOR PLAN 1
Patient with known h/o asthma, has not been able to follow-up with PCP in 1 year. Utilizing ED for exacerbation management. Off any home meds or inhalers. CXR with no infiltrates and no concern for infection at this time. 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 Received mag 2 x2, methylpred 125mg x1, and duoneb x4 in the ED and placed on bipap, weaned to room air 5/15 9AM.  Admission peak flow 180  5/15 9:30AM Peak flow 300, 11:30AM 350    Plan:  - continue with peak expiratory flow rate monitoring  - continue with prednisone 40 mg qd x7 days (5/21)  - Duoneb q4, wean prn  - C/w Symbicort 160 bid  - d/c with symbicort 160 bid and albuterol prn

## 2024-05-15 NOTE — PATIENT PROFILE ADULT - FALL HARM RISK - UNIVERSAL INTERVENTIONS
Bed in lowest position, wheels locked, appropriate side rails in place/Call bell, personal items and telephone in reach/Instruct patient to call for assistance before getting out of bed or chair/Non-slip footwear when patient is out of bed/[place] to call system/Physically safe environment - no spills, clutter or unnecessary equipment/Purposeful Proactive Rounding/Room/bathroom lighting operational, light cord in reach

## 2024-05-15 NOTE — H&P ADULT - NSHPREVIEWOFSYSTEMS_GEN_ALL_CORE
REVIEW OF SYSTEMS:    CONSTITUTIONAL: No weakness, fevers or chills  EYES/ENT: No visual changes;  No vertigo or throat pain   NECK: No pain or stiffness  RESPIRATORY: SOB, cough, no sputum production  CARDIOVASCULAR: No chest pain or palpitations  GASTROINTESTINAL: No abdominal or epigastric pain. No nausea, vomiting, or hematemesis; No diarrhea or constipation. No melena or hematochezia.  GENITOURINARY: No dysuria, frequency or hematuria  NEUROLOGICAL: No numbness or weakness  SKIN: No itching, rashes

## 2024-05-15 NOTE — H&P ADULT - ATTENDING COMMENTS
As above, this is a 31 y/o male with severe asthma, frequent hospitalizations, compliant with his medications and was being managed at another hospital but left there and came directly to [facility]. In the ED he was tachycardic, lung sounds soft, was in mild distress,  His peak flow was 180 and was admitted to the ICU for possible intubation but was placed on BiPAP and is tolerating it.  -severe asthma with acute exacerbation  >ABG, follow PF for progress and management, c/e steroids and bronchodilators.  His condition is guarded and he needs close attention.

## 2024-05-15 NOTE — H&P ADULT - PROBLEM SELECTOR PLAN 1
Patient with known h/o asthma, has not been able to follow-up with PCP in 1 year. Utilizing ED for exacerbation management. Off any home meds or inhalers. CXR with no infiltrates.   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

## 2024-05-15 NOTE — CHART NOTE - NSCHARTNOTEFT_GEN_A_CORE
The patient wishes to leave against medical advice.  I have discussed the risks, benefits and alternatives (including the possibility of worsening of disease, pain, permanent disability, and/or death) with the patient.  The patient voices understanding of these risks, benefits, and alternatives and still wishes to sign out against medical advice.  The patient is awake, alert, oriented  x 3 and has demonstrated capacity to refuse/direct care.  I have advised the patient that they can and should return immediately should they develop any worse/different/additional symptoms, or if they change their mind and want to continue their care.    The patient was sent prednisone to finish their course through 5/21, albuterol, and symbicort to their outside pharmacy [facility] [address] [place] [address].  The patient agreed to stay for his nebulizer treatment before signing out AMA.

## 2024-05-15 NOTE — PROGRESS NOTE ADULT - ASSESSMENT
34M with PMHx of asthma with multiple recent exacerbations requiring steroids (no prior hospitalizations or intubations) who presents with worsening asthma symptoms for >1 year after starting a job at a [employer]. Admitted to MICU for asthma exacerbation requiring BIPAP, no weaned to room air and stable for transfer to [facility].

## 2024-05-15 NOTE — DISCHARGE NOTE PROVIDER - NSDCMRMEDTOKEN_GEN_ALL_CORE_FT
Albuterol (Eqv-ProAir HFA) 90 mcg/inh inhalation aerosol: 2 puff(s) inhaled once a day as needed for  bronchospasm   Albuterol (Eqv-ProAir HFA) 90 mcg/inh inhalation aerosol: 2 puff(s) inhaled every 6 hours as needed for  bronchospasm  budesonide-formoterol 160 mcg-4.5 mcg/inh inhalation aerosol: 2 puff(s) inhaled every 12 hours  predniSONE 20 mg oral tablet: 2 tab(s) orally every 24 hours

## 2024-05-15 NOTE — DISCHARGE NOTE PROVIDER - HOSPITAL COURSE
#Discharge: do not delete    34 m pmhx of asthma presented with sob found to be in acute asthma exacerbation placed on bipap and admitted to micu. In the ED received mg 2g x2, methylprednisolone 125mg ivp and duonebs. CXR without opacity. Weaned off bipap. Tachycardia resolved, respiratory status improved, patient clinically improved and ready for transfer to [facility].    Problem List/Main Diagnoses:     New medications/therapies:   New lines/hardware:  Labs to be followed outpatient:   Exam to be followed outpatient:     Discharge plan: discharge to ______    Physical Exam Upon Discharge:     #Discharge: do not delete    34 m pmhx of asthma presented with sob found to be in acute asthma exacerbation placed on bipap and admitted to [facility]u. In the ED received mg 2g x2, methylprednisolone 125mg ivp and duonebs. CXR without opacity. Weaned off bipap. Tachycardia resolved, respiratory status improved, patient clinically improved and ready for transfer to [facility].    Problem List/Main Diagnoses:   #Asthma Exacerbation   Likely i/s/o exposure to dander. Known h/o asthma, has not been able to follow-up with PCP in 1 year. Utilizing ED for exacerbation management. Off any home meds or inhalers. CXR with no infiltrates. 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 Received mag 2 x2, methylpred 125mg x1, and duoneb x4 in the ED and placed on bipap, weaned to room air 5/15. Patient will continue prednisone taper, and pulmicort upon discharge  - f/u with pcp outpatient  - c/w prednisone 40 end date 5/21    New medications/therapies: budesonide, prednisone 40 7 day course (5/15 - 5/21)    Discharge plan: discharge to home    Physical Exam Upon Discharge:  Constitutional: NAD  HEENT: NC/AT  Neck: supple, no JVD  Cardiovascular: RRR  Respiratory: inspiratory and expiratory wheezes  Gastrointestinal: abdomen soft, NT/ND; no rebound or guarding; +BSx4  Genitourinary: no suprapubic tenderness or fullness  Extremities: WWP; no edema  Vascular: 2+ radial, DP/PT and femoral pulses B/L  Dermatologic: no rash  Neurological: AO 3   #Discharge: do not delete    34 m pmhx of asthma presented with sob found to be in acute asthma exacerbation placed on bipap and admitted to [facility]u. In the ED received mg 2g x2, methylprednisolone 125mg ivp and duonebs. CXR without opacity. Weaned off bipap. Tachycardia resolved, respiratory status improved, patient clinically improved and stable for discharge home.    Problem List/Main Diagnoses:   #Asthma Exacerbation   Likely i/s/o exposure to dander. Known h/o asthma, has not been able to follow-up with PCP in 1 year. Utilizing ED for exacerbation management. Off any home meds or inhalers. CXR with no infiltrates. 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 Received mag 2 x2, methylpred 125mg x1, and duoneb x4 in the ED and placed on bipap, weaned to room air 5/15. Patient will continue prednisone taper, and pulmicort upon discharge  - f/u with pcp outpatient  - c/w prednisone 40 end date 5/21    New medications/therapies: budesonide, prednisone 40 7 day course (5/15 - 5/21)    Discharge plan: discharge to home    Physical Exam Upon Discharge:  Constitutional: NAD  HEENT: NC/AT  Neck: supple, no JVD  Cardiovascular: RRR  Respiratory: inspiratory and expiratory wheezes  Gastrointestinal: abdomen soft, NT/ND; no rebound or guarding; +BSx4  Genitourinary: no suprapubic tenderness or fullness  Extremities: WWP; no edema  Vascular: 2+ radial, DP/PT and femoral pulses B/L  Dermatologic: no rash  Neurological: AO 3   #Discharge: do not delete    34 m pmhx of asthma presented with sob found to be in acute asthma exacerbation placed on bipap and admitted to micu. In the ED received mg 2g x2, methylprednisolone 125mg ivp and duonebs. CXR without opacity. Weaned off bipap. Tachycardia resolved, respiratory status improved, patient clinically improved and stable for discharge home.    Problem List/Main Diagnoses:   #Asthma Exacerbation   Likely i/s/o exposure to dander. Known h/o asthma, has not been able to follow-up with PCP in 1 year. Utilizing ED for exacerbation management. Off any home meds or inhalers. CXR with no infiltrates. 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 Received mag 2 x2, methylpred 125mg x1, and duoneb x4 in the ED and placed on bipap, weaned to room air 5/15. Patient will continue prednisone taper, and pulmicort upon discharge  - f/u with pcp outpatient  - c/w prednisone 40 end date 5/21    New medications/therapies: symbicort 160 bid, prednisone 40 - 7 day course (5/15 - 5/21)    Discharge plan: discharge to home    Physical Exam Upon Discharge:  Constitutional: NAD  HEENT: NC/AT  Neck: supple, no JVD  Cardiovascular: RRR  Respiratory: inspiratory and expiratory wheezes  Gastrointestinal: abdomen soft, NT/ND; no rebound or guarding; +BSx4  Genitourinary: no suprapubic tenderness or fullness  Extremities: WWP; no edema  Vascular: 2+ radial, DP/PT and femoral pulses B/L  Dermatologic: no rash  Neurological: AO 3   #Discharge: do not delete    34 m pmhx of asthma presented with sob found to be in acute asthma exacerbation placed on bipap and admitted to micu. In the ED received mg 2g x2, methylprednisolone 125mg ivp and duonebs. CXR without opacity. Weaned off bipap. Tachycardia resolved, respiratory status improved, patient clinically improved and stable for discharge home.    Problem List/Main Diagnoses:   #Asthma Exacerbation   Likely i/s/o exposure to dander. Known h/o asthma, has not been able to follow-up with PCP in 1 year. Utilizing ED for exacerbation management. Off any home meds or inhalers. CXR with no infiltrates. 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 Received mag 2 x2, methylpred 125mg x1, and duoneb x4 in the ED and placed on bipap, weaned to room air 5/15. Patient will continue prednisone taper, and symbicort upon discharge.  - f/u with pcp outpatient  - c/w prednisone 40 end date 5/21    New medications/therapies: symbicort 160 bid, prednisone 40 - 7 day course (5/15 - 5/21)    Discharge plan: discharge to home    Physical Exam Upon Discharge:  Constitutional: NAD  HEENT: NC/AT  Neck: supple, no JVD  Cardiovascular: RRR  Respiratory: inspiratory and expiratory wheezes  Gastrointestinal: abdomen soft, NT/ND; no rebound or guarding; +BSx4  Genitourinary: no suprapubic tenderness or fullness  Extremities: WWP; no edema  Vascular: 2+ radial, DP/PT and femoral pulses B/L  Dermatologic: no rash  Neurological: AO 3   #Discharge: do not delete    PATIENT LEFT AMA.  34 m pmhx of asthma presented with sob found to be in acute asthma exacerbation placed on bipap and admitted to [facility]. In the ED received mg 2g x2, methylprednisolone 125mg ivp and duonebs. CXR without opacity. Weaned off bipap. Tachycardia resolved, respiratory status improved, patient clinically improved.    Problem List/Main Diagnoses:   #Asthma Exacerbation   Likely i/s/o exposure to dander. Known h/o asthma, has not been able to follow-up with PCP in 1 year. Utilizing ED for exacerbation management. Off any home meds or inhalers. CXR with no infiltrates. 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 Received mag 2 x2, methylpred 125mg x1, and duoneb x4 in the ED and placed on bipap, weaned to room air 5/15. Patient will continue prednisone taper, and symbicort upon discharge.  - F/u with pcp outpatient  - c/w prednisone 40 end date 5/21  - C/w Symbicort 160mcg BID  - C/w albuterol PRN    New medications/therapies: Symbicort 160 bid, prednisone 40 - 7 day course (5/15 - 5/21)    Discharge plan: discharge to home    Physical Exam Upon Discharge:  Constitutional: NAD  HEENT: NC/AT  Neck: supple, no JVD  Cardiovascular: RRR  Respiratory: inspiratory and expiratory wheezes  Gastrointestinal: abdomen soft, NT/ND; no rebound or guarding; +BSx4  Genitourinary: no suprapubic tenderness or fullness  Extremities: WWP; no edema  Vascular: 2+ radial, DP/PT and femoral pulses B/L  Dermatologic: no rash  Neurological: AO 3

## 2024-05-15 NOTE — DISCHARGE NOTE PROVIDER - PROVIDER TOKENS
FREE:[LAST:[Rosemary],FIRST:[Sergio],PHONE:[phone]],FAX:[(   [phone]    -],ADDRESS:[Internal Medicine  [address] [place] [address]],FOLLOWUP:[1 week]]

## 2024-05-15 NOTE — H&P ADULT - NSHPPHYSICALEXAM_GEN_ALL_CORE
Please advise?     VITAL SIGNS:  T(C): 36.6 (05-14-24 @ 20:58), Max: 36.6 (05-14-24 @ 20:58)  T(F): 97.9 (05-14-24 @ 20:58), Max: 97.9 (05-14-24 @ 20:58)  HR: 113 (05-14-24 @ 22:49) (103 - 113)  BP: 157/69 (05-14-24 @ 22:49) (141/87 - 157/69)  BP(mean): --  RR: 24 (05-14-24 @ 22:49) (24 - 26)  SpO2: 92% (05-14-24 @ 22:49) (92% - 97%)  Wt(kg): --    PHYSICAL EXAM:  Constitutional: WDWN resting comfortably in bed; NAD  Head: NC/AT  Eyes: PERRL, EOMI, anicteric sclera  ENT: no nasal discharge; uvula midline, no oropharyngeal erythema or exudates; MMM  Neck: supple; no JVD or thyromegaly  Respiratory: CTA B/L; no W/R/R, no retractions  Cardiac: +S1/S2; RRR; no M/R/G; PMI non-displaced  Gastrointestinal: abdomen soft, NT/ND; no rebound or guarding  Back: spine midline, no bony tenderness or step-offs; no CVAT B/L  Extremities: WWP, no clubbing or cyanosis; no peripheral edema  Musculoskeletal: NROM x4; no joint swelling, tenderness or erythema  Vascular: 2+ radial, DP pulses B/L  Dermatologic: skin warm, dry and intact; no rashes, wounds, or scars  Neurologic: AAOx3; CNII-XII grossly intact; no focal deficits  Psychiatric: affect and characteristics of appearance, verbalizations, behaviors are appropriate T(C): 36.8 (05-15-24 @ 01:45), Max: 36.8 (05-15-24 @ 01:45)  HR: 109 (05-15-24 @ 02:06) (103 - 113)  BP: 163/67 (05-15-24 @ 02:00) (141/87 - 183/83)  RR: 16 (05-15-24 @ 02:06) (16 - 26)  SpO2: 97% (05-15-24 @ 02:06) (92% - 97%)    General: NAD, awake and alert, cooperative to exam  HEENT: No conjunctival injection, EOMI, MMM  Neck: supple, no JVD  Cardio: Euvolemic on exam, Warm and well perfused in bilateral upper and lower extremities, heart is with tachycardia but regular rythm, no murmurs auscultated  Resp: tachypneic, shallow fast breaths, speaks in full sentences at the moment, poor air entry, wheezing diffusely   Abdo: soft, NT/ND, +bowel sounds x4, no organomegaly or palpable mass    Vasc: 2+ radial and DP pulses b/l  Neuro: A&Ox3, no focal deficits  MSK: no joint swelling, full ROM

## 2024-05-15 NOTE — H&P ADULT - PROBLEM SELECTOR PLAN 2
Electrolytes: replete as necessary  Nutrition: regular   DVT: SCDs, IMPROVE score low  Code status: Full   Disposition: telemetry

## 2024-05-15 NOTE — PROGRESS NOTE ADULT - ASSESSMENT
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 Found with tachypnea and poor air movement. Admitted to MICU given high risk for intubation given worsening respiratory     NEURO  #BALTAZAR    CV  #Tachycardia   i/s/o respiratory pathology   As below      PULM  #Asthma Exacerbation   Likely i/s/o exposure to dander   Patient with known h/o asthma, has not been able to follow-up with PCP in 1 year. Utilizing ED for exacerbation management. Off any home meds or inhalers. CXR with no infiltrates.   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   Tachypnea >30, Peak Flow =180, tachycardia >120  s/p magnesium 2g x2, Methylpred 125mg, Duoneb x4  Plan:  - continue with peak expiratory flow rate monitoring  - continue with prednisone 40 mg qd x7 days  - Duoneb q4, inhaled Budesonide  - BiPAP o/n  - Wean O2 as tolerated      RENAL  #BALTAZAR      GI  #BALTAZAR      ENDO  #BALTAZAR      ID  #BALTAZAR      HEME/ONC  #BALTAZAR      PREVENTIVE   F: None   E: As needed  N: Regular diet   GI: None   DVT: None   DISPO: MICU     Patient is 34y Male with PMHx of *Asthma only on Albuterol inhaler at home who presents with 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 Found with tachypnea and poor air movement. Admitted to MICU given high risk for intubation given worsening respiratory     NEURO  #BALTAZAR    CV  #Tachycardia  (Resolved)  i/s/o respiratory pathology   As below    PULM  #Asthma Exacerbation   Likely i/s/o exposure to dander   Patient with known h/o asthma, has not been able to follow-up with PCP in 1 year. Utilizing ED for exacerbation management. Off any home meds or inhalers. CXR with no infiltrates.   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 Received mag 2 x2, methylpred 125mg x1, and duoneb x4 in the ED and placed on bipap, weaned to room air 5/15 9AM.  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 Found with tachypnea and poor air movement. Admitted to MICU given high risk for intubation given worsening respiratory.    NEURO  #BALTAZAR    CV  #Tachycardia  (Resolved)  i/s/o respiratory pathology     PULM  #Asthma Exacerbation   Likely i/s/o exposure to dander   Patient with known h/o asthma, has not been able to follow-up with PCP in 1 year. Utilizing ED for exacerbation management. Off any home meds or inhalers. CXR with no infiltrates.   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 Received mag 2 x2, methylpred 125mg x1, and duoneb x4 in the ED and placed on bipap, weaned to room air 5/15 9AM.  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 Found with tachypnea and poor air movement. Admitted to MICU given high risk for intubation given worsening respiratory.    NEURO  #BALTAZAR    CV  #Tachycardia  (Resolved)  i/s/o respiratory pathology     PULM  #Asthma Exacerbation   Likely i/s/o exposure to dander   Patient with known h/o asthma, has not been able to follow-up with PCP in 1 year. Utilizing ED for exacerbation management. Off any home meds or inhalers. CXR with no infiltrates.   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 Received mag 2 x2, methylpred 125mg x1, and duoneb x4 in the ED and placed on bipap, weaned to room air 5/15 9AM.  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 Found with tachypnea and poor air movement. Admitted to MICU given high risk for intubation given worsening respiratory.    NEURO  #BALTAZAR    CV  #Tachycardia  (Resolved)  i/s/o respiratory pathology     PULM  #Asthma Exacerbation   Likely i/s/o exposure to dander   Patient with known h/o asthma, has not been able to follow-up with PCP in 1 year. Utilizing ED for exacerbation management. Off any home meds or inhalers. CXR with no infiltrates.   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 Received mag 2 x2, methylpred 125mg x1, and duoneb x4 in the ED and placed on bipap, weaned to room air 5/15 9AM.  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 Found with tachypnea and poor air movement. Admitted to MICU given high risk for intubation given worsening respiratory.    NEURO  #BALTAZAR    CV  #Tachycardia  (Resolved)  i/s/o respiratory pathology     PULM  #Asthma Exacerbation   Likely i/s/o exposure to dander   Patient with known h/o asthma, has not been able to follow-up with PCP in 1 year. Utilizing ED for exacerbation management. Off any home meds or inhalers. CXR with no infiltrates.   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 Received mag 2 x2, methylpred 125mg x1, and duoneb x4 in the ED and placed on bipap, weaned to room air 5/15 9AM.  Admission peak flow 180  5/15 9:30AM Peak flow 300, 11:30AM 350    Plan:  - continue with peak expiratory flow rate monitoring  - continue with prednisone 40 mg qd x7 days (5/21)  - Duoneb q4, wean prn  - symbicort 160 bid  - d/c with symbicort 160 bid and albuterol prn    RENAL  #BALTAZAR      GI  #BALTAZAR      ENDO  #BALTAZAR      ID  #BALTAZAR      HEME/ONC  #BALTAZAR      PREVENTIVE   F: None   E: As needed  N: Regular diet   GI: ppi  DVT: None   DISPO: MICU -> RMF

## 2024-05-15 NOTE — DISCHARGE NOTE PROVIDER - CARE PROVIDER_API CALL
Sergio Riley  Internal Medicine  [address]  [place], [place] [address]  Phone: [phone]  Fax: (   )    -  Follow Up Time: 1 week

## 2024-05-15 NOTE — DISCHARGE NOTE PROVIDER - NSDCCPCAREPLAN_GEN_ALL_CORE_FT
PRINCIPAL DISCHARGE DIAGNOSIS  Diagnosis: Asthma exacerbation  Assessment and Plan of Treatment: You were found to have an exacerbation/attack of your asthma. An asthma attack is a sudden worsening of asthma symptoms. Asthma is a long-term condition that makes breathing difficult because airways in the lungs become narrow. Symptoms of asthma attack include coughing, wheezing, tightness in the chest and difficulty getting enough air.  These symptoms happen because muscles around airways tighten up, the airways become irritated and swollen, and the lining of the airways produces a fluid called mucus. All of these factors make it difficult to breathe.  You required non invasive ventilation and your breating was monitored in the ICU. Your breathing improved after receiving multiple medications including steroids. You will need to continue taking steroids and your pulmicort inhaler. You will need to continue taking your steroids until 5/21/24.  Medications  Pulmicort inhaler  Prednisone, end date 5/21/24  Please follow up with your primary care provider within 1 -2 weeks.  Please return to the emergency department if you have any shortness of breath or wheezing.     PRINCIPAL DISCHARGE DIAGNOSIS  Diagnosis: Asthma exacerbation  Assessment and Plan of Treatment: You were found to have an exacerbation/attack of your asthma. An asthma attack is a sudden worsening of asthma symptoms. Asthma is a long-term condition that makes breathing difficult because airways in the lungs become narrow. Symptoms of asthma attack include coughing, wheezing, tightness in the chest and difficulty getting enough air.  These symptoms happen because muscles around airways tighten up, the airways become irritated and swollen, and the lining of the airways produces a fluid called mucus. All of these factors make it difficult to breathe.  You required non invasive ventilation and your breating was monitored in the ICU. Your breathing improved after receiving multiple medications including steroids. You will need to continue taking steroids and your symbicort inhaler. You will need to continue taking your steroids until 5/21/24.  Medications  Symbicort inhaler twice a day  Albuterol inhaler as needed for shortness of breath or wheezing  Prednisone, end date 5/21/24  Please follow up with your primary care provider within 1 -2 weeks.  Please return to the emergency department if you have any shortness of breath or wheezing.

## 2024-05-15 NOTE — H&P ADULT - NSHPLABSRESULTS_GEN_ALL_CORE
LABS:                         14.6   7.54  )-----------( 365      ( 14 May 2024 22:12 )             43.6     05-14    141  |  101  |  12  ----------------------------<  118<H>  4.1   |  24  |  0.97    Ca    9.9      14 May 2024 22:12        Urinalysis Basic - ( 14 May 2024 22:12 )    Color: x / Appearance: x / SG: x / pH: x  Gluc: 118 mg/dL / Ketone: x  / Bili: x / Urobili: x   Blood: x / Protein: x / Nitrite: x   Leuk Esterase: x / RBC: x / WBC x   Sq Epi: x / Non Sq Epi: x / Bacteria: x            RADIOLOGY, EKG & ADDITIONAL TESTS: Reviewed.

## 2024-05-17 RX ORDER — FLUTICASONE PROPIONATE AND SALMETEROL 50; 250 UG/1; UG/1
1 POWDER ORAL; RESPIRATORY (INHALATION)
Qty: 1 | Refills: 3
Start: 2024-05-17 | End: 2024-09-13

## 2024-05-17 NOTE — CHART NOTE - NSCHARTNOTEFT_GEN_A_CORE
Resident attempted to set up new patient appointment at [facility] on [address] however, was notified that the patient states he has a PCP already and that he did not receive his medications at the pharmacy.     Resident called patient who states he received his prednisone and has albuterol at home but did not get Symbicort. Pharmacy [facility] on [address] states that Symbicort is not covered but Wixela is. New Rx for Wixela sent and resident notified patient of instructions on how to use it. Also emphasized importance of seeing PCP. Patient understands and thanks for the call.

## 2024-05-21 DIAGNOSIS — F17.210 NICOTINE DEPENDENCE, CIGARETTES, UNCOMPLICATED: ICD-10-CM

## 2024-05-21 DIAGNOSIS — F12.90 CANNABIS USE, UNSPECIFIED, UNCOMPLICATED: ICD-10-CM

## 2024-05-21 DIAGNOSIS — Z91.018 ALLERGY TO OTHER FOODS: ICD-10-CM

## 2024-05-21 DIAGNOSIS — J45.901 UNSPECIFIED ASTHMA WITH (ACUTE) EXACERBATION: ICD-10-CM
